# Patient Record
Sex: MALE | URBAN - METROPOLITAN AREA
[De-identification: names, ages, dates, MRNs, and addresses within clinical notes are randomized per-mention and may not be internally consistent; named-entity substitution may affect disease eponyms.]

---

## 2017-06-30 ENCOUNTER — OUTPATIENT (OUTPATIENT)
Dept: OUTPATIENT SERVICES | Facility: HOSPITAL | Age: 72
LOS: 1 days | End: 2017-06-30

## 2017-06-30 ENCOUNTER — APPOINTMENT (OUTPATIENT)
Dept: ULTRASOUND IMAGING | Facility: CLINIC | Age: 72
End: 2017-06-30

## 2017-06-30 DIAGNOSIS — M79.609 PAIN IN UNSPECIFIED LIMB: ICD-10-CM

## 2017-06-30 PROBLEM — Z00.00 ENCOUNTER FOR PREVENTIVE HEALTH EXAMINATION: Status: ACTIVE | Noted: 2017-06-30

## 2017-07-03 ENCOUNTER — TRANSCRIPTION ENCOUNTER (OUTPATIENT)
Age: 72
End: 2017-07-03

## 2019-11-19 ENCOUNTER — OFFICE VISIT (OUTPATIENT)
Dept: URGENT CARE | Facility: CLINIC | Age: 74
End: 2019-11-19
Payer: MEDICARE

## 2019-11-19 VITALS
OXYGEN SATURATION: 97 % | RESPIRATION RATE: 16 BRPM | HEIGHT: 69 IN | SYSTOLIC BLOOD PRESSURE: 143 MMHG | HEART RATE: 98 BPM | WEIGHT: 155 LBS | DIASTOLIC BLOOD PRESSURE: 88 MMHG | BODY MASS INDEX: 22.96 KG/M2 | TEMPERATURE: 98 F

## 2019-11-19 DIAGNOSIS — L50.0 ALLERGIC URTICARIA: ICD-10-CM

## 2019-11-19 DIAGNOSIS — L01.00 IMPETIGO: Primary | ICD-10-CM

## 2019-11-19 PROCEDURE — 99203 PR OFFICE/OUTPT VISIT, NEW, LEVL III, 30-44 MIN: ICD-10-PCS | Mod: 25,S$GLB,, | Performed by: NURSE PRACTITIONER

## 2019-11-19 PROCEDURE — 96372 THER/PROPH/DIAG INJ SC/IM: CPT | Mod: S$GLB,,, | Performed by: NURSE PRACTITIONER

## 2019-11-19 PROCEDURE — 96372 PR INJECTION,THERAP/PROPH/DIAG2ST, IM OR SUBCUT: ICD-10-PCS | Mod: S$GLB,,, | Performed by: NURSE PRACTITIONER

## 2019-11-19 PROCEDURE — 99203 OFFICE O/P NEW LOW 30 MIN: CPT | Mod: 25,S$GLB,, | Performed by: NURSE PRACTITIONER

## 2019-11-19 RX ORDER — INDOMETHACIN 25 MG/1
25 CAPSULE ORAL
COMMUNITY

## 2019-11-19 RX ORDER — FINASTERIDE 5 MG/1
5 TABLET, FILM COATED ORAL
COMMUNITY
Start: 2018-06-27

## 2019-11-19 RX ORDER — VALSARTAN AND HYDROCHLOROTHIAZIDE 160; 12.5 MG/1; MG/1
1 TABLET, FILM COATED ORAL DAILY
COMMUNITY

## 2019-11-19 RX ORDER — CARVEDILOL 25 MG/1
25 TABLET ORAL
COMMUNITY

## 2019-11-19 RX ORDER — FEBUXOSTAT 40 MG/1
40 TABLET, FILM COATED ORAL
COMMUNITY

## 2019-11-19 RX ORDER — DEXAMETHASONE SODIUM PHOSPHATE 100 MG/10ML
4 INJECTION INTRAMUSCULAR; INTRAVENOUS
Status: COMPLETED | OUTPATIENT
Start: 2019-11-19 | End: 2019-11-19

## 2019-11-19 RX ORDER — GLIPIZIDE 10 MG/1
TABLET ORAL
COMMUNITY
Start: 2018-07-21

## 2019-11-19 RX ORDER — ATORVASTATIN CALCIUM 40 MG/1
40 TABLET, FILM COATED ORAL
COMMUNITY

## 2019-11-19 RX ORDER — ASPIRIN 81 MG/1
81 TABLET ORAL
COMMUNITY

## 2019-11-19 RX ORDER — CEPHALEXIN 500 MG/1
500 CAPSULE ORAL EVERY 12 HOURS
Qty: 20 CAPSULE | Refills: 0 | Status: SHIPPED | OUTPATIENT
Start: 2019-11-19 | End: 2019-11-29

## 2019-11-19 RX ORDER — FAMOTIDINE 20 MG/1
20 TABLET, FILM COATED ORAL 2 TIMES DAILY
Qty: 14 TABLET | Refills: 0 | Status: SHIPPED | OUTPATIENT
Start: 2019-11-19 | End: 2019-11-26

## 2019-11-19 RX ORDER — METFORMIN HYDROCHLORIDE 1000 MG/1
1000 TABLET ORAL
COMMUNITY

## 2019-11-19 RX ORDER — TRIAMCINOLONE ACETONIDE 0.25 MG/G
CREAM TOPICAL 2 TIMES DAILY
Qty: 80 G | Refills: 0 | Status: SHIPPED | OUTPATIENT
Start: 2019-11-19 | End: 2019-11-29

## 2019-11-19 RX ORDER — MUPIROCIN 20 MG/G
OINTMENT TOPICAL
Qty: 22 G | Refills: 1 | Status: SHIPPED | OUTPATIENT
Start: 2019-11-19

## 2019-11-19 RX ADMIN — DEXAMETHASONE SODIUM PHOSPHATE 4 MG: 100 INJECTION INTRAMUSCULAR; INTRAVENOUS at 10:11

## 2019-11-19 NOTE — PROGRESS NOTES
"Subjective:       Patient ID: Isaias Alford is a 74 y.o. male.    Vitals:  height is 5' 9" (1.753 m) and weight is 70.3 kg (155 lb). His temperature is 98.1 °F (36.7 °C). His blood pressure is 143/88 (abnormal) and his pulse is 98. His respiration is 16 and oxygen saturation is 97%.     Chief Complaint: Rash    Pt c/o rash that started on his upper right thigh and now has it all over his body, pt states that he has been eating more shrimp then usual since he has been here and doesn't know if the shrimp may be causing this rash, pt was on a boat trip when he noticed the rash to his right upper thigh and doesn't know if it may have been something on the boat that caused this breakout .  Provider note begins below  Pt reports eating more shrimp than normal.  Pt reports rash to face, upper chest, glenn arms, right thumb and right upper thigh.  Pt has also come in contact with cleaning materials on boat.  Has had the rash for a week.  Denies outside activity with exposed skin.  Reports itching and no pain to sores.      Rash   This is a new problem. Episode onset: seven days  The problem has been gradually worsening since onset. The affected locations include the right upper leg. The rash is characterized by redness and itchiness. It is unknown if there was an exposure to a precipitant. Pertinent negatives include no congestion, cough, diarrhea, fatigue, fever, shortness of breath, sore throat or vomiting. Past treatments include antihistamine. The treatment provided no relief.       Constitution: Negative for chills, fatigue and fever.   HENT: Negative for congestion and sore throat.    Neck: Negative for painful lymph nodes.   Cardiovascular: Negative for chest pain and leg swelling.   Eyes: Negative for double vision and blurred vision.   Respiratory: Negative for cough and shortness of breath.    Gastrointestinal: Negative for nausea, vomiting and diarrhea.   Genitourinary: Negative for dysuria, frequency and urgency. "   Musculoskeletal: Negative for joint pain, joint swelling, muscle cramps and muscle ache.   Skin: Positive for rash. Negative for color change and pale.   Allergic/Immunologic: Negative for seasonal allergies.   Neurological: Negative for dizziness, history of vertigo, light-headedness, passing out and headaches.   Hematologic/Lymphatic: Negative for swollen lymph nodes, easy bruising/bleeding and history of blood clots. Does not bruise/bleed easily.   Psychiatric/Behavioral: Negative for nervous/anxious, sleep disturbance and depression. The patient is not nervous/anxious.        Objective:      Physical Exam   Constitutional: He is oriented to person, place, and time. He appears well-developed and well-nourished. He is cooperative.  Non-toxic appearance. He does not appear ill. No distress.   HENT:   Head: Normocephalic and atraumatic.       Right Ear: Hearing, tympanic membrane, external ear and ear canal normal.   Left Ear: Hearing, tympanic membrane, external ear and ear canal normal.   Nose: Nose normal. No mucosal edema, rhinorrhea or nasal deformity. No epistaxis. Right sinus exhibits no maxillary sinus tenderness and no frontal sinus tenderness. Left sinus exhibits no maxillary sinus tenderness and no frontal sinus tenderness.   Mouth/Throat: Uvula is midline, oropharynx is clear and moist and mucous membranes are normal. No trismus in the jaw. Normal dentition. No uvula swelling. No posterior oropharyngeal erythema.   Eyes: Conjunctivae and lids are normal. Right eye exhibits no discharge. Left eye exhibits no discharge. No scleral icterus.   Neck: Trachea normal, normal range of motion, full passive range of motion without pain and phonation normal. Neck supple.   Cardiovascular: Normal rate, regular rhythm, intact distal pulses and normal pulses.   Murmur heard.   Systolic murmur is present with a grade of 5/6.  Pt has aortic valve stenosis from previous infection as a child   Pulmonary/Chest: Effort  normal and breath sounds normal. No respiratory distress.   Abdominal: Soft. Normal appearance and bowel sounds are normal. He exhibits no distension, no pulsatile midline mass and no mass. There is no tenderness.   Musculoskeletal: Normal range of motion. He exhibits no edema or deformity.        Arms:       Hands:       Legs:  Neurological: He is alert and oriented to person, place, and time. He exhibits normal muscle tone. Coordination normal.   Skin: Skin is warm, dry, intact, not diaphoretic, not pale, rash, urticarial (to chest, face, glenn upper arms, right upper thigh) and pustular (blood filled blister to right thumb ).   Psychiatric: He has a normal mood and affect. His speech is normal and behavior is normal. Judgment and thought content normal. Cognition and memory are normal.   Nursing note and vitals reviewed.        Assessment:       1. Impetigo    2. Allergic urticaria        Plan:       Dexamethasone 4 mg.  Monitor glucose and adjust medication accordingly.  Discussed risk of increased glucose.  Pt understands and would like to proceed.   Treat open sores with abx po and topical to prevent spread of infection.  Calamine lotion to itching spots.  Avoid seafood.  Follow up with PCP.  Go to ER for fever or difficulty breathing.      Impetigo  -     cephALEXin (KEFLEX) 500 MG capsule; Take 1 capsule (500 mg total) by mouth every 12 (twelve) hours. for 10 days  Dispense: 20 capsule; Refill: 0  -     mupirocin (BACTROBAN) 2 % ointment; Apply to affected area, open sores, 3 times daily  Dispense: 22 g; Refill: 1    Allergic urticaria  -     dexamethasone injection 4 mg  -     triamcinolone acetonide 0.025% (KENALOG) 0.025 % cream; Apply topically 2 (two) times daily. Apply to rash without open sores for 10 days  Dispense: 80 g; Refill: 0  -     famotidine (PEPCID) 20 MG tablet; Take 1 tablet (20 mg total) by mouth 2 (two) times daily. for 7 days  Dispense: 14 tablet; Refill: 0         Patient Instructions        Hives (Adult)  Hives are pink or red bumps on the skin. These bumps are also known as wheals. The bumps can itch, burn, or sting. Hives can occur anywhere on the body. They vary in size and shape and can form in clusters. Individual hives can appear and go away quickly. New hives may develop as old ones fade. Hives are common and usually harmless. Occasionally hives are a sign of a serious allergy.  Hives are often caused by an allergic reaction. It may be an allergic reaction to foods such as fruit, shellfish, chocolate, nuts, or tomatoes. It may be a reaction to pollens, animal fur, or mold spores. Medicines, chemicals, and insect bites can also cause hives. And hives can be caused by hot sun or cold air. The cause of hives can be difficult to find.  You may be given medicines to relieve swelling and itching. Follow all instructions when using these medicines. The hives will usually fade in a few days, but can last up to 2 weeks.  Home care  Follow these tips:  · Try to find the cause of the hives and eliminate it. Discuss possible causes with your healthcare provider. Future reactions to the same allergen may be worse.  · Dont scratch the hives. Scratching will delay healing. To reduce itching, apply cool, wet compresses to the skin.  · Dress in soft, loose cotton clothing.  · Dont bathe in hot water. This can make the itching worse.  · Apply an ice pack or cool pack wrapped in a thin towel to your skin. This will help reduce redness and itching. But if your hives were caused by exposure to cold, then do not apply more cold to them.  · You may use over-the counter antihistamines to reduce itching. Some older antihistamines, such as diphenhydramine and chlorpheniramine, are inexpensive. But they need to be taken often and may make you sleepy. They are best used at bedtime. Dont use diphenhydramine if you have glaucoma or have trouble urinating because of an enlarged prostate. Newer antihistamines, such as  loratadine, cetirizine, and fexofenadine, are generally more expensive. But they tend to have fewer side effects, such as drowsiness. They can be taken less often.  · Another type of antihistamine is used to treat heartburn. This type includes ranitidine, nizatidine, famotidine, and cimetidine. These are sometimes used along with the above antihistamines if a single medicine is not working.  Follow-up care  Follow up with your healthcare provider if your symptoms don't get better in 2 days. Ask your provider about allergy testing if you have had a severe reaction, or have had several episodes of hives. He or she can use the allergy testing to find out what you are allergic to.  When to seek medical advice  Call your healthcare provider right away if any of these occur:  · Fever of 100.4°F (38.0°C) or higher, or as directed by your healthcare provider  · Redness, swelling, or pain  · Foul-smelling fluid coming from the rash  Call 911  Call 911 if any of the following occur:  · Swelling of the face, throat, or tongue  · Trouble breathing or swallowing  · Dizziness, weakness, or fainting  Date Last Reviewed: 9/1/2016  © 7762-3795 [employer]. [address] [place] [address], [place] [address]. All rights reserved. This information is not intended as a substitute for professional medical care. Always follow your healthcare professional's instructions.        Understanding Impetigo  Impetigo is a common bacterial infection of the skin. It most often affects the face, arms, and legs. But it can appear on any part of the body. Anyone can have it, regardless of age. But it is most common in children. Impetigo is very contagious. This means it spreads easily to other people.  How to say it  [id number]   What causes impetigo?  Many types of bacteria live on normal, healthy skin. The bacteria usually dont cause problems. Impetigo happens when bacteria enter the skin through a scratch, break, sore, bite, or irritated  spot. They then begin to grow out of control, leading to infection. There are two types of staphylococcus bacteria that cause impetigo. In certain cases, impetigo appears on skin that has no visible break. It may be more likely to occur on skin that has another skin problem, such as eczema. It may also be more common after a cold or other virus.  Symptoms of impetigo  Symptoms of this problem include:  · Small, fluid-filled blisters on the skin that may itch, ooze, or crust  · A yellow, honey-colored crust on the infected skin  · Skin sores that spread with scratching  · An itchy rash that spreads with scratching  · Swollen lymph nodes  Treatment for impetigo  The goal is to treat the infection and prevent it from spreading to others.  · You will likely be given an antibiotic to treat the infection. This may be a cream or ointment called muporicin to put on your skin. If the infection is severe or spreading, you may be given antibiotic medicine to take by mouth. Be sure to use this medicine as directed. Do not stop using it until you are told to stop, even if your skin gets better. If you stop too soon, the infection may come back and be harder to treat.  · Avoid scratching or picking at your sores. It may help to cover affected areas with a bandage.  · To prevent spreading the infection, wash your hands often. Avoid sharing personal items, towels, clothes, pillows, and sheets with others. After each use, wash these items in hot water.  · Clean the affected skin several times a day. Dont scrub. Instead, soak the area in warm, soapy water. This will help remove the crust that forms. For places that you can't soak, such as the face, place a clean, warm (not hot) washcloth on the affected area. Use a new washcloth and towel each time.  When to call your healthcare provider  Call your healthcare provider right away if you have any of these:  · Fever of 100.4°F (38°C) or higher, or as directed  · Increasing number of  sores or spreading areas of redness after 2 days of treatment with antibiotics  · Increasing swelling or pain  · Increased amounts of fluid or pus coming from the sores  · Unusual drowsiness, weakness, or change in behavior  · Loss of appetite or vomiting   Date Last Reviewed: 5/1/2016 © 2000-2017 [employer]. [address] [place] [id number]. All rights reserved. This information is not intended as a substitute for professional medical care. Always follow your healthcare professional's instructions.        Understanding Impetigo  Impetigo is a common bacterial infection of the skin. It most often affects the face, arms, and legs. But it can appear on any part of the body. Anyone can have it, regardless of age. But it is most common in children. Impetigo is very contagious. This means it spreads easily to other people.  How to say it  [id number]   What causes impetigo?  Many types of bacteria live on normal, healthy skin. The bacteria usually dont cause problems. Impetigo happens when bacteria enter the skin through a scratch, break, sore, bite, or irritated spot. They then begin to grow out of control, leading to infection. There are two types of staphylococcus bacteria that cause impetigo. In certain cases, impetigo appears on skin that has no visible break. It may be more likely to occur on skin that has another skin problem, such as eczema. It may also be more common after a cold or other virus.  Symptoms of impetigo  Symptoms of this problem include:  · Small, fluid-filled blisters on the skin that may itch, ooze, or crust  · A yellow, honey-colored crust on the infected skin  · Skin sores that spread with scratching  · An itchy rash that spreads with scratching  · Swollen lymph nodes  Treatment for impetigo  The goal is to treat the infection and prevent it from spreading to others.  · You will likely be given an antibiotic to treat the infection. This may be a cream or ointment  called muporicin to put on your skin. If the infection is severe or spreading, you may be given antibiotic medicine to take by mouth. Be sure to use this medicine as directed. Do not stop using it until you are told to stop, even if your skin gets better. If you stop too soon, the infection may come back and be harder to treat.  · Avoid scratching or picking at your sores. It may help to cover affected areas with a bandage.  · To prevent spreading the infection, wash your hands often. Avoid sharing personal items, towels, clothes, pillows, and sheets with others. After each use, wash these items in hot water.  · Clean the affected skin several times a day. Dont scrub. Instead, soak the area in warm, soapy water. This will help remove the crust that forms. For places that you can't soak, such as the face, place a clean, warm (not hot) washcloth on the affected area. Use a new washcloth and towel each time.  When to call your healthcare provider  Call your healthcare provider right away if you have any of these:  · Fever of 100.4°F (38°C) or higher, or as directed  · Increasing number of sores or spreading areas of redness after 2 days of treatment with antibiotics  · Increasing swelling or pain  · Increased amounts of fluid or pus coming from the sores  · Unusual drowsiness, weakness, or change in behavior  · Loss of appetite or vomiting   Date Last Reviewed: 5/1/2016  © 0856-9726 [employer]. [address] [place] [address], [place] [address]. All rights reserved. This information is not intended as a substitute for professional medical care. Always follow your healthcare professional's instructions.

## 2019-11-19 NOTE — PATIENT INSTRUCTIONS
Hives (Adult)  Hives are pink or red bumps on the skin. These bumps are also known as wheals. The bumps can itch, burn, or sting. Hives can occur anywhere on the body. They vary in size and shape and can form in clusters. Individual hives can appear and go away quickly. New hives may develop as old ones fade. Hives are common and usually harmless. Occasionally hives are a sign of a serious allergy.  Hives are often caused by an allergic reaction. It may be an allergic reaction to foods such as fruit, shellfish, chocolate, nuts, or tomatoes. It may be a reaction to pollens, animal fur, or mold spores. Medicines, chemicals, and insect bites can also cause hives. And hives can be caused by hot sun or cold air. The cause of hives can be difficult to find.  You may be given medicines to relieve swelling and itching. Follow all instructions when using these medicines. The hives will usually fade in a few days, but can last up to 2 weeks.  Home care  Follow these tips:  · Try to find the cause of the hives and eliminate it. Discuss possible causes with your healthcare provider. Future reactions to the same allergen may be worse.  · Dont scratch the hives. Scratching will delay healing. To reduce itching, apply cool, wet compresses to the skin.  · Dress in soft, loose cotton clothing.  · Dont bathe in hot water. This can make the itching worse.  · Apply an ice pack or cool pack wrapped in a thin towel to your skin. This will help reduce redness and itching. But if your hives were caused by exposure to cold, then do not apply more cold to them.  · You may use over-the counter antihistamines to reduce itching. Some older antihistamines, such as diphenhydramine and chlorpheniramine, are inexpensive. But they need to be taken often and may make you sleepy. They are best used at bedtime. Dont use diphenhydramine if you have glaucoma or have trouble urinating because of an enlarged prostate. Newer antihistamines, such as  loratadine, cetirizine, and fexofenadine, are generally more expensive. But they tend to have fewer side effects, such as drowsiness. They can be taken less often.  · Another type of antihistamine is used to treat heartburn. This type includes ranitidine, nizatidine, famotidine, and cimetidine. These are sometimes used along with the above antihistamines if a single medicine is not working.  Follow-up care  Follow up with your healthcare provider if your symptoms don't get better in 2 days. Ask your provider about allergy testing if you have had a severe reaction, or have had several episodes of hives. He or she can use the allergy testing to find out what you are allergic to.  When to seek medical advice  Call your healthcare provider right away if any of these occur:  · Fever of 100.4°F (38.0°C) or higher, or as directed by your healthcare provider  · Redness, swelling, or pain  · Foul-smelling fluid coming from the rash  Call 911  Call 911 if any of the following occur:  · Swelling of the face, throat, or tongue  · Trouble breathing or swallowing  · Dizziness, weakness, or fainting  Date Last Reviewed: 9/1/2016  © 7741-0228 [employer]. [address] [place] [address], [place] [address]. All rights reserved. This information is not intended as a substitute for professional medical care. Always follow your healthcare professional's instructions.        Understanding Impetigo  Impetigo is a common bacterial infection of the skin. It most often affects the face, arms, and legs. But it can appear on any part of the body. Anyone can have it, regardless of age. But it is most common in children. Impetigo is very contagious. This means it spreads easily to other people.  How to say it  [id number]   What causes impetigo?  Many types of bacteria live on normal, healthy skin. The bacteria usually dont cause problems. Impetigo happens when bacteria enter the skin through a scratch, break, sore, bite, or irritated  spot. They then begin to grow out of control, leading to infection. There are two types of staphylococcus bacteria that cause impetigo. In certain cases, impetigo appears on skin that has no visible break. It may be more likely to occur on skin that has another skin problem, such as eczema. It may also be more common after a cold or other virus.  Symptoms of impetigo  Symptoms of this problem include:  · Small, fluid-filled blisters on the skin that may itch, ooze, or crust  · A yellow, honey-colored crust on the infected skin  · Skin sores that spread with scratching  · An itchy rash that spreads with scratching  · Swollen lymph nodes  Treatment for impetigo  The goal is to treat the infection and prevent it from spreading to others.  · You will likely be given an antibiotic to treat the infection. This may be a cream or ointment called muporicin to put on your skin. If the infection is severe or spreading, you may be given antibiotic medicine to take by mouth. Be sure to use this medicine as directed. Do not stop using it until you are told to stop, even if your skin gets better. If you stop too soon, the infection may come back and be harder to treat.  · Avoid scratching or picking at your sores. It may help to cover affected areas with a bandage.  · To prevent spreading the infection, wash your hands often. Avoid sharing personal items, towels, clothes, pillows, and sheets with others. After each use, wash these items in hot water.  · Clean the affected skin several times a day. Dont scrub. Instead, soak the area in warm, soapy water. This will help remove the crust that forms. For places that you can't soak, such as the face, place a clean, warm (not hot) washcloth on the affected area. Use a new washcloth and towel each time.  When to call your healthcare provider  Call your healthcare provider right away if you have any of these:  · Fever of 100.4°F (38°C) or higher, or as directed  · Increasing number of  sores or spreading areas of redness after 2 days of treatment with antibiotics  · Increasing swelling or pain  · Increased amounts of fluid or pus coming from the sores  · Unusual drowsiness, weakness, or change in behavior  · Loss of appetite or vomiting   Date Last Reviewed: 5/1/2016 © 2000-2017 [employer]. [address] [place] [id number]. All rights reserved. This information is not intended as a substitute for professional medical care. Always follow your healthcare professional's instructions.        Understanding Impetigo  Impetigo is a common bacterial infection of the skin. It most often affects the face, arms, and legs. But it can appear on any part of the body. Anyone can have it, regardless of age. But it is most common in children. Impetigo is very contagious. This means it spreads easily to other people.  How to say it  [id number]   What causes impetigo?  Many types of bacteria live on normal, healthy skin. The bacteria usually dont cause problems. Impetigo happens when bacteria enter the skin through a scratch, break, sore, bite, or irritated spot. They then begin to grow out of control, leading to infection. There are two types of staphylococcus bacteria that cause impetigo. In certain cases, impetigo appears on skin that has no visible break. It may be more likely to occur on skin that has another skin problem, such as eczema. It may also be more common after a cold or other virus.  Symptoms of impetigo  Symptoms of this problem include:  · Small, fluid-filled blisters on the skin that may itch, ooze, or crust  · A yellow, honey-colored crust on the infected skin  · Skin sores that spread with scratching  · An itchy rash that spreads with scratching  · Swollen lymph nodes  Treatment for impetigo  The goal is to treat the infection and prevent it from spreading to others.  · You will likely be given an antibiotic to treat the infection. This may be a cream or ointment  called muporicin to put on your skin. If the infection is severe or spreading, you may be given antibiotic medicine to take by mouth. Be sure to use this medicine as directed. Do not stop using it until you are told to stop, even if your skin gets better. If you stop too soon, the infection may come back and be harder to treat.  · Avoid scratching or picking at your sores. It may help to cover affected areas with a bandage.  · To prevent spreading the infection, wash your hands often. Avoid sharing personal items, towels, clothes, pillows, and sheets with others. After each use, wash these items in hot water.  · Clean the affected skin several times a day. Dont scrub. Instead, soak the area in warm, soapy water. This will help remove the crust that forms. For places that you can't soak, such as the face, place a clean, warm (not hot) washcloth on the affected area. Use a new washcloth and towel each time.  When to call your healthcare provider  Call your healthcare provider right away if you have any of these:  · Fever of 100.4°F (38°C) or higher, or as directed  · Increasing number of sores or spreading areas of redness after 2 days of treatment with antibiotics  · Increasing swelling or pain  · Increased amounts of fluid or pus coming from the sores  · Unusual drowsiness, weakness, or change in behavior  · Loss of appetite or vomiting   Date Last Reviewed: 5/1/2016  © 1263-1984 [employer]. [address] [place] [address], [place] [address]. All rights reserved. This information is not intended as a substitute for professional medical care. Always follow your healthcare professional's instructions.

## 2019-11-20 ENCOUNTER — TELEPHONE (OUTPATIENT)
Dept: URGENT CARE | Facility: CLINIC | Age: 74
End: 2019-11-20

## 2019-11-20 NOTE — TELEPHONE ENCOUNTER
Pt reports he is feeling better today.  The rash is subsiding.  The blister has popped and he has applied antibiotic ointment.

## 2019-11-21 ENCOUNTER — TELEPHONE (OUTPATIENT)
Dept: URGENT CARE | Facility: CLINIC | Age: 74
End: 2019-11-21